# Patient Record
Sex: FEMALE | Race: WHITE | Employment: STUDENT | URBAN - METROPOLITAN AREA
[De-identification: names, ages, dates, MRNs, and addresses within clinical notes are randomized per-mention and may not be internally consistent; named-entity substitution may affect disease eponyms.]

---

## 2018-05-30 ENCOUNTER — OFFICE VISIT (OUTPATIENT)
Dept: HEMATOLOGY/ONCOLOGY | Age: 18
End: 2018-05-30
Attending: SPECIALIST
Payer: COMMERCIAL

## 2018-05-30 VITALS
RESPIRATION RATE: 18 BRPM | SYSTOLIC BLOOD PRESSURE: 115 MMHG | BODY MASS INDEX: 20 KG/M2 | OXYGEN SATURATION: 99 % | DIASTOLIC BLOOD PRESSURE: 61 MMHG | HEART RATE: 64 BPM | TEMPERATURE: 97 F | WEIGHT: 120.63 LBS

## 2018-05-30 DIAGNOSIS — I82.619 CEPHALIC VEIN THROMBOSIS: ICD-10-CM

## 2018-05-30 DIAGNOSIS — I82.721 ARM DVT (DEEP VENOUS THROMBOEMBOLISM), CHRONIC, RIGHT (HCC): Primary | ICD-10-CM

## 2018-05-30 DIAGNOSIS — I80.8 SUPERFICIAL THROMBOPHLEBITIS OF RIGHT UPPER EXTREMITY: ICD-10-CM

## 2018-05-30 DIAGNOSIS — I82.619 BASILIC VEIN THROMBOSIS: ICD-10-CM

## 2018-05-30 PROCEDURE — 99245 OFF/OP CONSLTJ NEW/EST HI 55: CPT | Performed by: SPECIALIST

## 2018-05-30 NOTE — PROGRESS NOTES
[facility] Report of Consultation      Patient Name: Mita Gautam   YOB: 2000  Medical Record Number: [id number]  Consulting Physician: Clarence Collins. Aamir Gregory M.D. Referring Physician: Papito Betancourt M.D.     Date of Consultatio antibodies. Patient states that she does experience some discomfort in the right arm after strenuous use but not at rest. She does not report significant swelling. Patient has no previous history of thrombosis.  Her maternal grandmother had a \"blood No nausea, vomiting, diarrhea, constipation, melena, hematochezia, heartburn, early satiety, change in stool caliber. Genitourinary      No abnormal vaginal bleeding. Musculoskeletal   As per HPI. Integumentary      No acute or chronic rashes.   Neurol Both instances can be traced back to placement of peripheral IVs. It is debatable whether a hypercoagulable workup should have been pursued at all given the situation and the superficial nature of the thromboses.  However, only a partial workup was performe

## 2018-05-30 NOTE — PROGRESS NOTES
Patient is here today for Consult with Nadira Vines. Patient denies pain. Medication list and medical history were reviewed and updated.     Education Record    Learner:  Patient and Family Member/mother    Disease / Diagnosis:Hematology Consult    Barriers /

## 2018-06-01 ENCOUNTER — TELEPHONE (OUTPATIENT)
Dept: HEMATOLOGY/ONCOLOGY | Facility: HOSPITAL | Age: 18
End: 2018-06-01

## 2018-06-01 NOTE — TELEPHONE ENCOUNTER
MD Varghese Pérez, LIAM             Let patient know that her antithrombin III level is normal. [phone]. No evidence of a clotting disorder.

## 2018-06-18 ENCOUNTER — TELEPHONE (OUTPATIENT)
Dept: HEMATOLOGY/ONCOLOGY | Facility: HOSPITAL | Age: 18
End: 2018-06-18

## 2018-06-20 ENCOUNTER — TELEPHONE (OUTPATIENT)
Dept: HEMATOLOGY/ONCOLOGY | Facility: HOSPITAL | Age: 18
End: 2018-06-20

## 2018-06-20 NOTE — TELEPHONE ENCOUNTER
Pt calling, states she was out of town when Dr. Gary Madrid called last week. States he had a question for her and wanted her to return his call. Message sent to Dr. Gary Madrid to call pt back.

## 2018-06-21 ENCOUNTER — APPOINTMENT (OUTPATIENT)
Dept: LAB | Facility: HOSPITAL | Age: 18
End: 2018-06-21
Attending: SPECIALIST
Payer: COMMERCIAL

## 2018-06-21 DIAGNOSIS — I82.721 ARM DVT (DEEP VENOUS THROMBOEMBOLISM), CHRONIC, RIGHT (HCC): ICD-10-CM

## 2018-06-21 PROCEDURE — 85303 CLOT INHIBIT PROT C ACTIVITY: CPT

## 2018-06-21 PROCEDURE — 36415 COLL VENOUS BLD VENIPUNCTURE: CPT

## 2018-06-21 PROCEDURE — 85306 CLOT INHIBIT PROT S FREE: CPT

## 2018-07-05 ENCOUNTER — TELEPHONE (OUTPATIENT)
Dept: HEMATOLOGY/ONCOLOGY | Facility: HOSPITAL | Age: 18
End: 2018-07-05

## 2018-07-05 NOTE — TELEPHONE ENCOUNTER
MD Ella Horne RN   Caller: Unspecified (Today,  3:10 PM)             Tell her that we check two labs - protein C and protein S. Protein C is normal. Protein S is low but this is expected in someone who is on birth control.  The o

## 2018-07-23 NOTE — TELEPHONE ENCOUNTER
Return call from patient - she has decided to have the protein S test repeated. She stopped her birth control approximately July 5th or 6th. Stated she is coming home next week and would like to have the test at that time.  Instructed would notify Keagan Blancas

## 2018-07-23 NOTE — TELEPHONE ENCOUNTER
MD Annalisa Gomez RN   Caller: Unspecified (2 weeks ago)             Let her know that lab order is in the computer. Patient notified.

## 2018-08-01 ENCOUNTER — APPOINTMENT (OUTPATIENT)
Dept: LAB | Facility: HOSPITAL | Age: 18
End: 2018-08-01
Attending: SPECIALIST
Payer: COMMERCIAL

## 2018-08-01 DIAGNOSIS — I82.621 ACUTE DEEP VEIN THROMBOSIS (DVT) OF OTHER VEIN OF RIGHT UPPER EXTREMITY (HCC): ICD-10-CM

## 2018-08-01 PROCEDURE — 36415 COLL VENOUS BLD VENIPUNCTURE: CPT

## 2018-08-01 PROCEDURE — 85306 CLOT INHIBIT PROT S FREE: CPT

## 2018-08-02 LAB — PROTEIN S ACTIVITY: 66 % (ref 65–140)

## 2018-08-09 ENCOUNTER — TELEPHONE (OUTPATIENT)
Dept: HEMATOLOGY/ONCOLOGY | Facility: HOSPITAL | Age: 18
End: 2018-08-09

## 2018-08-09 NOTE — TELEPHONE ENCOUNTER
MD Lg Bryson RN             Please call patient. Let her know that protein S level is normal off of birth control so there is no predisposition to clot formation and no contraindication to restarting birth control.       Patient

## 2019-01-28 ENCOUNTER — TELEPHONE (OUTPATIENT)
Dept: HEMATOLOGY/ONCOLOGY | Facility: HOSPITAL | Age: 19
End: 2019-01-28

## 2019-01-28 NOTE — TELEPHONE ENCOUNTER
Per Brennen Check - left message to return call. Instructed need type of Clot. Was she on birth control and what is being done about the clot. Instructed to return call.

## 2019-01-28 NOTE — TELEPHONE ENCOUNTER
Another Superficial Left lesser saphronous  Vein. Still on birthcontrol. Baby Aspirin and warm compress. Patient questions. Patient wanted to know if she should discontinue birthcontrol.     Should she decrease her vigorous exercise - she is an